# Patient Record
Sex: FEMALE | Race: WHITE | NOT HISPANIC OR LATINO | URBAN - METROPOLITAN AREA
[De-identification: names, ages, dates, MRNs, and addresses within clinical notes are randomized per-mention and may not be internally consistent; named-entity substitution may affect disease eponyms.]

---

## 2017-12-18 ENCOUNTER — IMPORTED ENCOUNTER (OUTPATIENT)
Dept: URBAN - METROPOLITAN AREA CLINIC 1 | Facility: CLINIC | Age: 72
End: 2017-12-18

## 2017-12-18 PROBLEM — Z96.1: Noted: 2017-12-18

## 2017-12-18 PROBLEM — H04.123: Noted: 2017-12-18

## 2017-12-18 PROBLEM — H16.143: Noted: 2017-12-18

## 2017-12-18 PROCEDURE — 92014 COMPRE OPH EXAM EST PT 1/>: CPT

## 2017-12-18 NOTE — PATIENT DISCUSSION
1.  FOX w/ PEK OU- Increase ATs to TID OU Routinely. Consider Plugs without improvement. 2.  Pseudophakia OU - (Standard OU) |Letter to PCP Return for an appointment in 1 yr 27 with Dr. Mark Gastelum.

## 2018-12-20 ENCOUNTER — IMPORTED ENCOUNTER (OUTPATIENT)
Dept: URBAN - METROPOLITAN AREA CLINIC 1 | Facility: CLINIC | Age: 73
End: 2018-12-20

## 2018-12-20 PROBLEM — H04.123: Noted: 2018-12-20

## 2018-12-20 PROBLEM — H16.143: Noted: 2018-12-20

## 2018-12-20 PROBLEM — H35.362: Noted: 2018-12-20

## 2018-12-20 PROBLEM — Z96.1: Noted: 2018-12-20

## 2018-12-20 PROCEDURE — 92014 COMPRE OPH EXAM EST PT 1/>: CPT

## 2018-12-20 NOTE — PATIENT DISCUSSION
1.  FOX w/ PEK OU -- Recommend the frequent use of OTC AT's TID-QID OU Routinely. 2.  Macular Drusen OS -- Stable. Observe / Monitor. 3. Pseudophakia OU (Standard) -- Doing well. Patient defers the refraction at today's visit. Return for an appointment in 1 YR for a 30 OU with Dr. Len Cox.

## 2019-12-19 ENCOUNTER — IMPORTED ENCOUNTER (OUTPATIENT)
Dept: URBAN - METROPOLITAN AREA CLINIC 1 | Facility: CLINIC | Age: 74
End: 2019-12-19

## 2019-12-19 PROBLEM — H43.812: Noted: 2019-12-19

## 2019-12-19 PROBLEM — H16.143: Noted: 2019-12-19

## 2019-12-19 PROBLEM — H04.123: Noted: 2019-12-19

## 2019-12-19 PROBLEM — H35.362: Noted: 2019-12-19

## 2019-12-19 PROBLEM — Z96.1: Noted: 2019-12-19

## 2019-12-19 PROCEDURE — 92014 COMPRE OPH EXAM EST PT 1/>: CPT

## 2019-12-19 NOTE — PATIENT DISCUSSION
1.  FOX w/ PEK OU- Use ATs TID OU Routinely. 2.  Macular Drusen OS- stable observe. 3.  Pseudophakia OU - (Standard OU)4. PVD OS (New) RD precautions. Letter to PCP MRx deferredReturn for an appointment in 1 yr 27 with Dr. Amanda David.

## 2020-12-10 ENCOUNTER — IMPORTED ENCOUNTER (OUTPATIENT)
Dept: URBAN - METROPOLITAN AREA CLINIC 1 | Facility: CLINIC | Age: 75
End: 2020-12-10

## 2020-12-10 PROBLEM — Z96.1: Noted: 2020-12-10

## 2020-12-10 PROBLEM — H43.812: Noted: 2020-12-10

## 2020-12-10 PROBLEM — H16.143: Noted: 2020-12-10

## 2020-12-10 PROBLEM — H35.362: Noted: 2020-12-10

## 2020-12-10 PROBLEM — H04.123: Noted: 2020-12-10

## 2020-12-10 PROCEDURE — 92014 COMPRE OPH EXAM EST PT 1/>: CPT

## 2020-12-10 NOTE — PATIENT DISCUSSION
1.  FOX w/ PEK OU -- Use ATs TID OU Routinely. 2.  Macular Drusen OS -- Stable observe. 3.  Pseudophakia OU -- (Standard OU)4. PVD OS -- Stable. RD precautions. MRx deferredReturn for an appointment in 1 year 27 with Dr. Karely Richardson.

## 2021-12-10 ENCOUNTER — IMPORTED ENCOUNTER (OUTPATIENT)
Dept: URBAN - METROPOLITAN AREA CLINIC 1 | Facility: CLINIC | Age: 76
End: 2021-12-10

## 2021-12-10 PROBLEM — H43.813: Status: STABILIZING | Noted: 2021-12-10

## 2021-12-10 PROBLEM — H35.373: Noted: 2021-12-10

## 2021-12-10 PROBLEM — H04.123: Noted: 2021-12-10

## 2021-12-10 PROBLEM — Z96.1: Noted: 2021-12-10

## 2021-12-10 PROBLEM — H35.371: Noted: 2021-12-10

## 2021-12-10 PROBLEM — H26.493: Noted: 2021-12-10

## 2021-12-10 PROBLEM — H16.143: Noted: 2021-12-10

## 2021-12-10 PROBLEM — H43.812: Noted: 2021-12-10

## 2021-12-10 PROBLEM — H35.362: Noted: 2021-12-10

## 2021-12-10 PROCEDURE — 99214 OFFICE O/P EST MOD 30 MIN: CPT

## 2021-12-10 NOTE — PATIENT DISCUSSION
1.  FOX w/ PEK OU -- Continue ATs QID OU routinely. Recommend increasing frequency when pt on computer or for extended periods of reading. 2. Epiretinal Membrane OU -- New. Condition discussed. Observe for change. 3. 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 4. Macular Drusen OS -- Stable. Observe. 5. Pseudophakia OU -- (Standard OU) Doing well. 6. 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 RD precautions. Return for an appointment in 1 year for a 30/glare with Dr. Iban Lange.

## 2022-04-02 ASSESSMENT — VISUAL ACUITY
OD_CC: 20/20
OD_CC: 20/20
OS_GLARE: 20/50
OS_CC: 20/20
OS_CC: 20/20
OD_CC: 20/25
OD_CC: 20/20-2
OS_CC: 20/30-2
OD_GLARE: 20/50
OS_CC: 20/25-2
OS_CC: 20/30
OD_CC: 20/30
OD_GLARE: 20/40
OS_GLARE: 20/50

## 2022-04-02 ASSESSMENT — TONOMETRY
OS_IOP_MMHG: 13
OD_IOP_MMHG: 13
OD_IOP_MMHG: 12
OD_IOP_MMHG: 14
OS_IOP_MMHG: 12
OD_IOP_MMHG: 13
OS_IOP_MMHG: 14
OS_IOP_MMHG: 13
OD_IOP_MMHG: 14
OS_IOP_MMHG: 14

## 2022-12-13 ENCOUNTER — COMPREHENSIVE EXAM (OUTPATIENT)
Dept: URBAN - METROPOLITAN AREA CLINIC 1 | Facility: CLINIC | Age: 77
End: 2022-12-13

## 2022-12-13 PROCEDURE — 92015 DETERMINE REFRACTIVE STATE: CPT

## 2022-12-13 PROCEDURE — 99214 OFFICE O/P EST MOD 30 MIN: CPT

## 2022-12-13 ASSESSMENT — VISUAL ACUITY
OS_BAT: 20/50
OS_SC: 20/30
OD_BAT: 20/40
OD_SC: 20/30

## 2022-12-13 ASSESSMENT — TONOMETRY
OD_IOP_MMHG: 12
OS_IOP_MMHG: 12

## 2022-12-13 NOTE — PATIENT DISCUSSION
Glasses RX given to the patent today. Observe and consider YAG cap when pt feels pco visually significant and visual acuity decreases to appropriate level.

## 2024-01-08 ENCOUNTER — COMPREHENSIVE EXAM (OUTPATIENT)
Dept: URBAN - METROPOLITAN AREA CLINIC 1 | Facility: CLINIC | Age: 79
End: 2024-01-08

## 2024-01-08 DIAGNOSIS — H35.362: ICD-10-CM

## 2024-01-08 DIAGNOSIS — H26.493: ICD-10-CM

## 2024-01-08 DIAGNOSIS — H04.123: ICD-10-CM

## 2024-01-08 PROCEDURE — 99214 OFFICE O/P EST MOD 30 MIN: CPT

## 2024-01-08 ASSESSMENT — VISUAL ACUITY
OD_SC: 20/25-1
OS_SC: 20/25-1

## 2024-01-08 ASSESSMENT — TONOMETRY
OD_IOP_MMHG: 11
OS_IOP_MMHG: 12

## 2025-01-23 ENCOUNTER — COMPREHENSIVE EXAM (OUTPATIENT)
Age: 80
End: 2025-01-23

## 2025-01-23 DIAGNOSIS — H35.373: ICD-10-CM

## 2025-01-23 DIAGNOSIS — H26.493: ICD-10-CM

## 2025-01-23 DIAGNOSIS — H35.362: ICD-10-CM

## 2025-01-23 DIAGNOSIS — Z96.1: ICD-10-CM

## 2025-01-23 DIAGNOSIS — H04.123: ICD-10-CM

## 2025-01-23 DIAGNOSIS — H16.143: ICD-10-CM

## 2025-01-23 DIAGNOSIS — H43.813: ICD-10-CM

## 2025-01-23 PROCEDURE — 99214 OFFICE O/P EST MOD 30 MIN: CPT
